# Patient Record
Sex: FEMALE | URBAN - METROPOLITAN AREA
[De-identification: names, ages, dates, MRNs, and addresses within clinical notes are randomized per-mention and may not be internally consistent; named-entity substitution may affect disease eponyms.]

---

## 2019-05-09 ENCOUNTER — APPOINTMENT (RX ONLY)
Dept: URBAN - METROPOLITAN AREA CLINIC 45 | Facility: CLINIC | Age: 64
Setting detail: DERMATOLOGY
End: 2019-05-09

## 2019-05-09 DIAGNOSIS — L85.3 XEROSIS CUTIS: ICD-10-CM

## 2019-05-09 DIAGNOSIS — D22 MELANOCYTIC NEVI: ICD-10-CM

## 2019-05-09 DIAGNOSIS — L43.8 OTHER LICHEN PLANUS: ICD-10-CM

## 2019-05-09 PROBLEM — C18.9 MALIGNANT NEOPLASM OF COLON, UNSPECIFIED: Status: ACTIVE | Noted: 2019-05-09

## 2019-05-09 PROBLEM — L30.9 DERMATITIS, UNSPECIFIED: Status: ACTIVE | Noted: 2019-05-09

## 2019-05-09 PROBLEM — J45.909 UNSPECIFIED ASTHMA, UNCOMPLICATED: Status: ACTIVE | Noted: 2019-05-09

## 2019-05-09 PROBLEM — D22.9 MELANOCYTIC NEVI, UNSPECIFIED: Status: ACTIVE | Noted: 2019-05-09

## 2019-05-09 PROCEDURE — ? COUNSELING

## 2019-05-09 PROCEDURE — 99203 OFFICE O/P NEW LOW 30 MIN: CPT | Mod: 25

## 2019-05-09 PROCEDURE — ? BIOPSY BY PUNCH METHOD

## 2019-05-09 PROCEDURE — ? PRESCRIPTION

## 2019-05-09 PROCEDURE — 11104 PUNCH BX SKIN SINGLE LESION: CPT

## 2019-05-09 PROCEDURE — ? TREATMENT REGIMEN

## 2019-05-09 RX ORDER — CLOBETASOL PROPIONATE 0.5 MG/G
AEROSOL, FOAM TOPICAL
Qty: 1 | Refills: 3 | Status: ERX | COMMUNITY
Start: 2019-05-09

## 2019-05-09 RX ADMIN — CLOBETASOL PROPIONATE: 0.5 AEROSOL, FOAM TOPICAL at 00:00

## 2019-05-09 ASSESSMENT — LOCATION SIMPLE DESCRIPTION DERM: LOCATION SIMPLE: RIGHT ANKLE

## 2019-05-09 ASSESSMENT — LOCATION DETAILED DESCRIPTION DERM: LOCATION DETAILED: RIGHT ANKLE

## 2019-05-09 ASSESSMENT — LOCATION ZONE DERM: LOCATION ZONE: LEG

## 2019-05-09 NOTE — PROCEDURE: BIOPSY BY PUNCH METHOD
Deep Sutures: 5-0 Vicryl
Suture Removal: 7 days
Size Of Lesion In Cm (Optional): 0
Body Location Override (Optional - Billing Will Still Be Based On Selected Body Map Location If Applicable): right foot
Anesthesia Volume In Cc (Will Not Render If 0): 0.5
Wound Care: Bacitracin
Bill For Surgical Tray: no
Detail Level: Simple
Post-Care Instructions: I reviewed with the patient in detail post-care instructions. Patient is to keep the biopsy site dry overnight, and then apply bacitracin twice daily until healed. Patient may apply hydrogen peroxide soaks to remove any crusting.
Punch Size In Mm: 3
Hemostasis: None
Lab: [id number]
Consent: Written consent was obtained and risks were reviewed including but not limited to scarring, infection, bleeding, scabbing, incomplete removal, nerve damage and allergy to anesthesia.
Was A Bandage Applied: Yes
Home Suture Removal Text: Patient was provided a home suture removal kit and will remove their sutures at home.  If they have any questions or difficulties they will call the office.
Epidermal Sutures: 5-0 Ethilon
Lab Facility: [id number]
Anesthesia Type: 1% lidocaine with epinephrine
Billing Type: Third-Party Bill
Notification Instructions: Patient will be notified of biopsy results. However, patient instructed to call the office if not contacted within 2 weeks.
Biopsy Type: H and E
Dressing: bandage

## 2019-05-09 NOTE — PROCEDURE: TREATMENT REGIMEN
Detail Level: Zone
Plan: Location: bilateral legs/ feet\\nPrescribe: Olux-E foam top qd x 30 days\\n\\nPt states that she has purpric lesions on bilateral legs today.\\nPt discussed that it is very itchy.\\nPt has not used anything on them, only OTC.\\nDiscussed with pt that this looks like lichen planus today.\\nPt states that at times it does start as blisters, but does not scratch them.\\nPt discussed that she does have chest problems and has been and out the hospital, which may have caused her to feel a little more stressed out.\\nDiscussed with pt that this could be a factor to her recent flare up.\\nPt is very worried so will do a punch bx to get a definitive dx.\\nDiscussed with pt that we will give her a sample of Olux-E today to use to help with inflammation.\\nWill also prescribe Olux-E if it does return as Lichen Planus.\\nAdvised pt to use a good MC such as Cerave daily to help reestablish a healthy skin barrier.\\nF/u in 2 weeks for s/r.

## 2019-05-23 ENCOUNTER — APPOINTMENT (RX ONLY)
Dept: URBAN - METROPOLITAN AREA CLINIC 45 | Facility: CLINIC | Age: 64
Setting detail: DERMATOLOGY
End: 2019-05-23

## 2019-05-23 DIAGNOSIS — D22 MELANOCYTIC NEVI: ICD-10-CM

## 2019-05-23 DIAGNOSIS — L43.8 OTHER LICHEN PLANUS: ICD-10-CM

## 2019-05-23 DIAGNOSIS — Z48.02 ENCOUNTER FOR REMOVAL OF SUTURES: ICD-10-CM

## 2019-05-23 DIAGNOSIS — L85.3 XEROSIS CUTIS: ICD-10-CM

## 2019-05-23 PROBLEM — D22.9 MELANOCYTIC NEVI, UNSPECIFIED: Status: ACTIVE | Noted: 2019-05-23

## 2019-05-23 PROCEDURE — ? COUNSELING

## 2019-05-23 PROCEDURE — ? PRESCRIPTION

## 2019-05-23 PROCEDURE — ? SUTURE REMOVAL (GLOBAL PERIOD)

## 2019-05-23 PROCEDURE — ? TREATMENT REGIMEN

## 2019-05-23 PROCEDURE — 99213 OFFICE O/P EST LOW 20 MIN: CPT

## 2019-05-23 PROCEDURE — ? PRESCRIPTION SAMPLES PROVIDED

## 2019-05-23 RX ORDER — CLOBETASOL PROPIONATE 0.46 MG/ML
SOLUTION TOPICAL
Qty: 1 | Refills: 3 | Status: ERX | COMMUNITY
Start: 2019-05-23

## 2019-05-23 RX ADMIN — CLOBETASOL PROPIONATE: 0.46 SOLUTION TOPICAL at 16:12

## 2019-05-23 ASSESSMENT — LOCATION DETAILED DESCRIPTION DERM: LOCATION DETAILED: RIGHT ANTERIOR LATERAL MALLEOLUS

## 2019-05-23 ASSESSMENT — LOCATION SIMPLE DESCRIPTION DERM: LOCATION SIMPLE: RIGHT ANKLE

## 2019-05-23 ASSESSMENT — LOCATION ZONE DERM: LOCATION ZONE: LEG

## 2019-05-23 NOTE — PROCEDURE: TREATMENT REGIMEN
Plan: Location: bilateral legs\\n\\nLocation: bilateral legs/ feet\\nPrescribe: Olux-E foam top qd x 30 days\\n\\nPt states that she has purpric lesions on bilateral legs today.\\nPt discussed that it is very itchy.\\nPt has not used anything on them, only OTC.\\nDiscussed with pt that this looks like lichen planus today.\\nPt states that at times it does start as blisters, but does not scratch them.\\nPt discussed that she does have chest problems and has been and out the hospital, which may have caused her to feel a little more stressed out.\\nDiscussed with pt that this could be a factor to her recent flare up.\\nPt is very worried so will do a punch bx to get a definitive dx.\\nDiscussed with pt that we will give her a sample of Olux-E today to use to help with inflammation.\\nWill also prescribe Olux-E if it does return as Lichen Planus.\\nAdvised pt to use a good MC such as Cerave daily to help reestablish a healthy skin barrier.\\nF/u in 2 weeks for s/r.
Detail Level: Zone

## 2019-07-03 ENCOUNTER — APPOINTMENT (RX ONLY)
Dept: URBAN - METROPOLITAN AREA CLINIC 45 | Facility: CLINIC | Age: 64
Setting detail: DERMATOLOGY
End: 2019-07-03

## 2019-07-03 DIAGNOSIS — L85.3 XEROSIS CUTIS: ICD-10-CM

## 2019-07-03 DIAGNOSIS — L43.8 OTHER LICHEN PLANUS: ICD-10-CM

## 2019-07-03 DIAGNOSIS — D22 MELANOCYTIC NEVI: ICD-10-CM

## 2019-07-03 PROBLEM — D22.9 MELANOCYTIC NEVI, UNSPECIFIED: Status: ACTIVE | Noted: 2019-07-03

## 2019-07-03 PROCEDURE — ? COUNSELING

## 2019-07-03 PROCEDURE — ? INTRALESIONAL KENALOG

## 2019-07-03 PROCEDURE — 99213 OFFICE O/P EST LOW 20 MIN: CPT | Mod: 25

## 2019-07-03 PROCEDURE — ? TREATMENT REGIMEN

## 2019-07-03 PROCEDURE — 11900 INJECT SKIN LESIONS </W 7: CPT

## 2019-07-03 ASSESSMENT — LOCATION ZONE DERM
LOCATION ZONE: TRUNK
LOCATION ZONE: LEG
LOCATION ZONE: FEET

## 2019-07-03 ASSESSMENT — LOCATION DETAILED DESCRIPTION DERM
LOCATION DETAILED: RIGHT ANKLE
LOCATION DETAILED: RIGHT DORSAL FOOT
LOCATION DETAILED: RIGHT MEDIAL BUTTOCK

## 2019-07-03 ASSESSMENT — LOCATION SIMPLE DESCRIPTION DERM
LOCATION SIMPLE: RIGHT ANKLE
LOCATION SIMPLE: RIGHT BUTTOCK
LOCATION SIMPLE: RIGHT FOOT

## 2019-07-03 NOTE — PROCEDURE: TREATMENT REGIMEN
Detail Level: Zone
Plan: Location: bilateral legs\\n\\nLocation: bilateral legs/ feet\\nTreatment : Olux-E foam top qd x 30 days\\n\\nPt states that she has purpric lesions on bilateral legs today.\\nPt discussed that it is very itchy.\\nPt has not used anything on them, only OTC.\\nDiscussed with pt that this looks like lichen planus today.\\nPt states that at times it does start as blisters, but does not scratch them.\\nPt discussed that she does have chest problems and has been and out the hospital, which may have caused her to feel a little more stressed out.\\nDiscussed with pt that this could be a factor to her recent flare up.\\nPt is very worried so will do a punch bx to get a definitive dx.\\nDiscussed with pt that we will give her a sample of Olux-E today to use to help with inflammation.\\nWill also prescribe Olux-E if it does return as Lichen Planus.\\nAdvised pt to use a good MC such as Cerave daily to help reestablish a healthy skin barrier.\\nF/u in 2 weeks for s/r.

## 2019-07-03 NOTE — PROCEDURE: INTRALESIONAL KENALOG
Medical Necessity Clause: This procedure was medically necessary because the lesions that were treated were:
Kenalog Preparation: Kenalog
Include Z78.9 (Other Specified Conditions Influencing Health Status) As An Associated Diagnosis?: No
Concentration Of Solution Injected (Mg/Ml): 40.0
Total Volume Injected (Ccs- Only Use Numbers And Decimals): 1
Consent: The risks of atrophy were reviewed with the patient.
X Size Of Lesion In Cm (Optional): 0
Detail Level: Detailed

## 2019-08-22 ENCOUNTER — APPOINTMENT (RX ONLY)
Dept: URBAN - METROPOLITAN AREA CLINIC 45 | Facility: CLINIC | Age: 64
Setting detail: DERMATOLOGY
End: 2019-08-22

## 2019-08-22 DIAGNOSIS — L85.3 XEROSIS CUTIS: ICD-10-CM

## 2019-08-22 DIAGNOSIS — L43.8 OTHER LICHEN PLANUS: ICD-10-CM

## 2019-08-22 PROCEDURE — ? INTRALESIONAL KENALOG

## 2019-08-22 PROCEDURE — ? TREATMENT REGIMEN

## 2019-08-22 PROCEDURE — 99213 OFFICE O/P EST LOW 20 MIN: CPT | Mod: 25

## 2019-08-22 PROCEDURE — ? PRESCRIPTION

## 2019-08-22 PROCEDURE — 11901 INJECT SKIN LESIONS >7: CPT

## 2019-08-22 PROCEDURE — ? COUNSELING

## 2019-08-22 RX ORDER — CLOBETASOL PROPIONATE 0.5 MG/G
OINTMENT TOPICAL
Qty: 1 | Refills: 3 | Status: ERX | COMMUNITY
Start: 2019-08-22

## 2019-08-22 RX ORDER — CLOBETASOL PROPIONATE 0.46 MG/ML
SOLUTION TOPICAL
Qty: 1 | Refills: 3 | Status: ERX

## 2019-08-22 RX ORDER — FLUOCINOLONE ACETONIDE 0.11 MG/ML
OIL TOPICAL
Qty: 1 | Refills: 3 | Status: ERX | COMMUNITY
Start: 2019-08-22

## 2019-08-22 RX ADMIN — FLUOCINOLONE ACETONIDE: 0.11 OIL TOPICAL at 13:13

## 2019-08-22 RX ADMIN — CLOBETASOL PROPIONATE: 0.5 OINTMENT TOPICAL at 13:22

## 2019-08-22 ASSESSMENT — LOCATION DETAILED DESCRIPTION DERM
LOCATION DETAILED: INFERIOR LUMBAR SPINE
LOCATION DETAILED: LEFT ANTERIOR PROXIMAL THIGH
LOCATION DETAILED: RIGHT DORSAL FOOT
LOCATION DETAILED: LEFT ANTERIOR PROXIMAL UPPER ARM
LOCATION DETAILED: RIGHT MEDIAL BUTTOCK
LOCATION DETAILED: RIGHT ANKLE
LOCATION DETAILED: RIGHT PROXIMAL POSTERIOR THIGH
LOCATION DETAILED: LEFT PROXIMAL MEDIAL POSTERIOR THIGH
LOCATION DETAILED: LEFT BUTTOCK
LOCATION DETAILED: EPIGASTRIC SKIN
LOCATION DETAILED: LEFT ANTERIOR DISTAL UPPER ARM
LOCATION DETAILED: RIGHT ANTERIOR DISTAL UPPER ARM
LOCATION DETAILED: LEFT ANKLE
LOCATION DETAILED: RIGHT ANTERIOR PROXIMAL THIGH
LOCATION DETAILED: RIGHT BUTTOCK
LOCATION DETAILED: RIGHT ANTERIOR PROXIMAL UPPER ARM

## 2019-08-22 ASSESSMENT — LOCATION SIMPLE DESCRIPTION DERM
LOCATION SIMPLE: RIGHT BUTTOCK
LOCATION SIMPLE: LEFT THIGH
LOCATION SIMPLE: LEFT BUTTOCK
LOCATION SIMPLE: RIGHT FOOT
LOCATION SIMPLE: RIGHT ANKLE
LOCATION SIMPLE: LEFT UPPER ARM
LOCATION SIMPLE: LEFT POSTERIOR THIGH
LOCATION SIMPLE: LEFT ANKLE
LOCATION SIMPLE: ABDOMEN
LOCATION SIMPLE: RIGHT THIGH
LOCATION SIMPLE: RIGHT UPPER ARM
LOCATION SIMPLE: RIGHT POSTERIOR THIGH
LOCATION SIMPLE: LOWER BACK

## 2019-08-22 ASSESSMENT — LOCATION ZONE DERM
LOCATION ZONE: TRUNK
LOCATION ZONE: FEET
LOCATION ZONE: LEG
LOCATION ZONE: ARM

## 2019-08-22 NOTE — PROCEDURE: INTRALESIONAL KENALOG
Concentration Of Solution Injected (Mg/Ml): 40.0
Detail Level: Detailed
Consent: The risks of atrophy were reviewed with the patient.
Total Volume Injected (Ccs- Only Use Numbers And Decimals): 1
Include Z78.9 (Other Specified Conditions Influencing Health Status) As An Associated Diagnosis?: No
Medical Necessity Clause: This procedure was medically necessary because the lesions that were treated were:
X Size Of Lesion In Cm (Optional): 0
Kenalog Preparation: Kenalog with 5-fluorouracil

## 2019-08-22 NOTE — PROCEDURE: TREATMENT REGIMEN
Detail Level: Zone
Plan: Location: bilateral legs/ feet\\nPrescribe: Derma-Smoothe/FS Body Oil 0.01 % daily\\n                 clobetasol 0.05 % scalp solution \\n                 clobetasol 0.05 % topical ointment\\n\\nPt is here for 3 month f/u.\\nPt has been using Clobetasol ointment on areas, some improvement.\\nPictures taken and compared today.\\nDiscussed with pt that her skin is looking better and not as inflamed as it was previously.\\nPt discussed that the initial areas are clearing up, but new lesions are appearing throughout her body.\\nPt states that her work has been very stressful, which could have triggered new flare up.\\n\\nDiscussed with pt that we will inject areas with 5FU/ ILK40 to help relieve inflammation.\\nDiscussed with pt that we will prescribe Clobetasol solution and ointment so she can have more medication and so she could see which one she likes better.\\n\\nDiscussed with pt that we will prescribe Derma-Smoothe/FS Body Oil to use for body and hands as well.\\nF/u in 6 weeks.\\n---------------\\nPt states that she has purpric lesions on bilateral legs today.\\nPt discussed that it is very itchy.\\nPt has not used anything on them, only OTC.\\nDiscussed with pt that this looks like lichen planus today.\\nPt states that at times it does start as blisters, but does not scratch them.\\nPt discussed that she does have chest problems and has been and out the hospital, which may have caused her to feel a little more stressed out.\\nDiscussed with pt that this could be a factor to her recent flare up.\\nPt is very worried so will do a punch bx to get a definitive dx.\\nDiscussed with pt that we will give her a sample of Olux-E today to use to help with inflammation.\\nWill also prescribe Olux-E if it does return as Lichen Planus.\\nAdvised pt to use a good MC such as Cerave daily to help reestablish a healthy skin barrier.\\nF/u in 2 weeks for s/r.

## 2019-11-07 ENCOUNTER — APPOINTMENT (RX ONLY)
Dept: URBAN - METROPOLITAN AREA CLINIC 45 | Facility: CLINIC | Age: 64
Setting detail: DERMATOLOGY
End: 2019-11-07

## 2019-11-07 DIAGNOSIS — L85.3 XEROSIS CUTIS: ICD-10-CM

## 2019-11-07 DIAGNOSIS — L43.8 OTHER LICHEN PLANUS: ICD-10-CM

## 2019-11-07 PROCEDURE — ? TREATMENT REGIMEN

## 2019-11-07 PROCEDURE — ? COUNSELING

## 2019-11-07 PROCEDURE — ? PRESCRIPTION

## 2019-11-07 PROCEDURE — 99213 OFFICE O/P EST LOW 20 MIN: CPT

## 2019-11-07 RX ORDER — FLUOCINOLONE ACETONIDE 0.11 MG/ML
OIL TOPICAL
Qty: 1 | Refills: 3 | Status: ERX

## 2019-11-07 RX ORDER — CLOBETASOL PROPIONATE 0.5 MG/G
OINTMENT TOPICAL
Qty: 1 | Refills: 4 | Status: ERX

## 2019-11-07 ASSESSMENT — LOCATION DETAILED DESCRIPTION DERM: LOCATION DETAILED: RIGHT MEDIAL BUTTOCK

## 2019-11-07 ASSESSMENT — LOCATION SIMPLE DESCRIPTION DERM: LOCATION SIMPLE: RIGHT BUTTOCK

## 2019-11-07 ASSESSMENT — LOCATION ZONE DERM: LOCATION ZONE: TRUNK

## 2020-02-06 ENCOUNTER — APPOINTMENT (RX ONLY)
Dept: URBAN - METROPOLITAN AREA CLINIC 45 | Facility: CLINIC | Age: 65
Setting detail: DERMATOLOGY
End: 2020-02-06

## 2020-02-06 DIAGNOSIS — L43.8 OTHER LICHEN PLANUS: ICD-10-CM

## 2020-02-06 DIAGNOSIS — L85.3 XEROSIS CUTIS: ICD-10-CM

## 2020-02-06 PROCEDURE — ? INTRALESIONAL KENALOG

## 2020-02-06 PROCEDURE — ? COUNSELING

## 2020-02-06 PROCEDURE — ? TREATMENT REGIMEN

## 2020-02-06 PROCEDURE — ? PRESCRIPTION

## 2020-02-06 PROCEDURE — 99213 OFFICE O/P EST LOW 20 MIN: CPT | Mod: 25

## 2020-02-06 RX ORDER — CLOBETASOL PROPIONATE 0.5 MG/G
OINTMENT TOPICAL
Qty: 1 | Refills: 4 | Status: ERX

## 2020-02-06 RX ORDER — FLUOCINOLONE ACETONIDE 0.11 MG/ML
OIL TOPICAL
Qty: 1 | Refills: 3 | Status: ERX

## 2020-02-06 ASSESSMENT — LOCATION SIMPLE DESCRIPTION DERM
LOCATION SIMPLE: RIGHT BUTTOCK
LOCATION SIMPLE: LEFT BUTTOCK
LOCATION SIMPLE: LEFT PRETIBIAL REGION
LOCATION SIMPLE: RIGHT LOWER BACK
LOCATION SIMPLE: LOWER BACK
LOCATION SIMPLE: LEFT LOWER BACK
LOCATION SIMPLE: RIGHT PRETIBIAL REGION

## 2020-02-06 ASSESSMENT — LOCATION ZONE DERM
LOCATION ZONE: LEG
LOCATION ZONE: TRUNK

## 2020-02-06 ASSESSMENT — LOCATION DETAILED DESCRIPTION DERM
LOCATION DETAILED: RIGHT SUPERIOR MEDIAL LOWER BACK
LOCATION DETAILED: SUPERIOR LUMBAR SPINE
LOCATION DETAILED: RIGHT MEDIAL BUTTOCK
LOCATION DETAILED: SACRAL SPINE
LOCATION DETAILED: LEFT SUPERIOR MEDIAL LOWER BACK
LOCATION DETAILED: LEFT MEDIAL BUTTOCK
LOCATION DETAILED: LEFT DISTAL PRETIBIAL REGION
LOCATION DETAILED: RIGHT DISTAL PRETIBIAL REGION

## 2020-02-06 NOTE — PROCEDURE: INTRALESIONAL KENALOG
Medical Necessity Clause: This procedure was medically necessary because the lesions that were treated were:
Kenalog Preparation: Kenalog
X Size Of Lesion In Cm (Optional): 0
Detail Level: Detailed
Total Volume Injected (Ccs- Only Use Numbers And Decimals): 0.1
Consent: The risks of atrophy were reviewed with the patient.
Include Z78.9 (Other Specified Conditions Influencing Health Status) As An Associated Diagnosis?: No
Concentration Of Solution Injected (Mg/Ml): 2.5

## 2020-02-06 NOTE — PROCEDURE: TREATMENT REGIMEN
Detail Level: Zone
Plan: Location: bilateral legs/ feet\\nPrescribe: Derma-Smoothe/FS Body Oil 0.01 % daily\\n                 clobetasol 0.05 % scalp solution \\n                 clobetasol 0.05 % topical ointment\\nTreatment: 5FU/K40 injections\\n\\n02/06/2020\\n\\nPatient is here for a 3 month follow up \\nPatient states she was instructed by her pharmacist to only use topical steroids for two weeks and then take one week off and repeat the process.\\nDiscussed with the patient that she should use topical steroids until inflammation has calmed down and then once that has occurred she should then use medication as needed.  \\nToday I will be injecting patient with 5FU/K40 to eliminate inflammation and allow skin to heal.  \\nInstructions were written in detail and handed to the patient.\\nPLAN:\\n1. Derma-smoothe body oil is to be applied daily after shower, safe to use daily.\\n2. Clobetasol topical cream and solution is safe to use daily until flares have improved and then she is to use as needed. \\nPatient is to continue current treatment regimen and follow up in 3 months \\n\\nPatient is to follow up in 3 months \\n---------------------------------------------------------------------\\n\\nPt is here for 3 month f/u.\\nPt has been using Clobetasol ointment on areas, some improvement.\\nPictures taken and compared today.\\nDiscussed with pt that her skin is looking better and not as inflamed as it was previously.\\nPt discussed that the initial areas are clearing up, but new lesions are appearing throughout her body.\\nSHE IS ONLY HAVING A FEW LESIONS THAT ARE ACTIVE----MOST OF WHAT IS VISIBLE TODAY IS SIMPLY HYPERPIGMENTATION\\n---Encouraged her to continue using the clobetasol on active lesions\\nPt states that her work has been very stressful, which could have triggered new flare up.\\n\\nDiscussed with pt that we will inject areas with 5FU/ ILK40 to help relieve inflammation.\\nDiscussed with pt that we will prescribe Clobetasol solution and ointment so she can have more medication and so she could see which one she likes better.\\n\\nDiscussed with pt that we will prescribe Derma-Smoothe/FS Body Oil to use for body and hands as well.\\nF/u in 6 weeks.\\n---------------\\nPt states that she has purpric lesions on bilateral legs today.\\nPt discussed that it is very itchy.\\nPt has not used anything on them, only OTC.\\nDiscussed with pt that this looks like lichen planus today.\\nPt states that at times it does start as blisters, but does not scratch them.\\nPt discussed that she does have chest problems and has been and out the hospital, which may have caused her to feel a little more stressed out.\\nDiscussed with pt that this could be a factor to her recent flare up.\\nPt is very worried so will do a punch bx to get a definitive dx.\\nDiscussed with pt that we will give her a sample of Olux-E today to use to help with inflammation.\\nWill also prescribe Olux-E if it does return as Lichen Planus.\\nAdvised pt to use a good MC such as Cerave daily to help reestablish a healthy skin barrier.\\nF/u in 2 weeks for s/r.

## 2020-03-25 RX ORDER — CLOBETASOL PROPIONATE 0.5 MG/ML
SOLUTION TOPICAL
Qty: 1 | Refills: 3 | Status: ERX

## 2020-03-25 RX ORDER — CLOBETASOL PROPIONATE 0.5 MG/G
OINTMENT TOPICAL
Qty: 1 | Refills: 4 | Status: ERX

## 2020-03-30 ENCOUNTER — RX ONLY (OUTPATIENT)
Age: 65
Setting detail: RX ONLY
End: 2020-03-30

## 2020-03-30 RX ORDER — CLOBETASOL PROPIONATE 0.5 MG/ML
SOLUTION TOPICAL
Qty: 1 | Refills: 3 | Status: ERX

## 2020-04-30 ENCOUNTER — RX ONLY (OUTPATIENT)
Age: 65
Setting detail: RX ONLY
End: 2020-04-30

## 2020-04-30 ENCOUNTER — APPOINTMENT (RX ONLY)
Dept: URBAN - METROPOLITAN AREA CLINIC 45 | Facility: CLINIC | Age: 65
Setting detail: DERMATOLOGY
End: 2020-04-30

## 2020-04-30 DIAGNOSIS — L85.3 XEROSIS CUTIS: ICD-10-CM

## 2020-04-30 DIAGNOSIS — L43.8 OTHER LICHEN PLANUS: ICD-10-CM

## 2020-04-30 PROCEDURE — 11901 INJECT SKIN LESIONS >7: CPT

## 2020-04-30 PROCEDURE — ? COUNSELING

## 2020-04-30 PROCEDURE — ? TREATMENT REGIMEN

## 2020-04-30 PROCEDURE — 99213 OFFICE O/P EST LOW 20 MIN: CPT | Mod: 25

## 2020-04-30 PROCEDURE — ? PRESCRIPTION

## 2020-04-30 PROCEDURE — ? INTRALESIONAL KENALOG

## 2020-04-30 RX ORDER — CLOBETASOL PROPIONATE 0.5 MG/ML
SOLUTION TOPICAL
Qty: 1 | Refills: 3 | Status: CANCELLED
Stop reason: CLARIF

## 2020-04-30 RX ORDER — FLUOCINOLONE ACETONIDE 0.11 MG/ML
OIL TOPICAL
Qty: 1 | Refills: 3 | Status: ERX

## 2020-04-30 RX ORDER — CLOBETASOL PROPIONATE 0.5 MG/G
OINTMENT TOPICAL
Qty: 1 | Refills: 4 | Status: CANCELLED
Stop reason: ENTERED-IN-ERROR

## 2020-04-30 RX ORDER — CLOBETASOL PROPIONATE 0.5 MG/G
OINTMENT TOPICAL
Qty: 1 | Refills: 4 | Status: ERX

## 2020-04-30 ASSESSMENT — LOCATION ZONE DERM
LOCATION ZONE: TRUNK
LOCATION ZONE: LEG

## 2020-04-30 ASSESSMENT — LOCATION DETAILED DESCRIPTION DERM
LOCATION DETAILED: RIGHT MEDIAL BUTTOCK
LOCATION DETAILED: LEFT SUPERIOR MEDIAL LOWER BACK
LOCATION DETAILED: SACRAL SPINE
LOCATION DETAILED: LEFT MEDIAL BUTTOCK
LOCATION DETAILED: RIGHT DISTAL PRETIBIAL REGION
LOCATION DETAILED: RIGHT SUPERIOR MEDIAL LOWER BACK
LOCATION DETAILED: SUPERIOR LUMBAR SPINE
LOCATION DETAILED: LEFT DISTAL PRETIBIAL REGION

## 2020-04-30 ASSESSMENT — LOCATION SIMPLE DESCRIPTION DERM
LOCATION SIMPLE: LEFT LOWER BACK
LOCATION SIMPLE: RIGHT LOWER BACK
LOCATION SIMPLE: LOWER BACK
LOCATION SIMPLE: LEFT BUTTOCK
LOCATION SIMPLE: RIGHT PRETIBIAL REGION
LOCATION SIMPLE: RIGHT BUTTOCK
LOCATION SIMPLE: LEFT PRETIBIAL REGION

## 2020-04-30 NOTE — PROCEDURE: INTRALESIONAL KENALOG
Kenalog Preparation: Kenalog
Detail Level: Detailed
Concentration Of Solution Injected (Mg/Ml): 2.5
X Size Of Lesion In Cm (Optional): 0
Consent: The risks of atrophy were reviewed with the patient.
Medical Necessity Clause: This procedure was medically necessary because the lesions that were treated were:
Total Volume Injected (Ccs- Only Use Numbers And Decimals): 0.1
Include Z78.9 (Other Specified Conditions Influencing Health Status) As An Associated Diagnosis?: No

## 2020-04-30 NOTE — PROCEDURE: TREATMENT REGIMEN
Detail Level: Zone
Plan: Location: bilateral legs/ feet, abdomen, back, arms and under arms\\nPrescribe: Derma-Smoothe/FS Body Oil 0.01 % daily\\n                 clobetasol 0.05 % scalp solution \\n                 clobetasol 0.05 % topical ointment\\nTreatment: 5FU/K40 injections \\n\\nFollow up \\nLOV 02/06/2020\\nPhotos taken\\n\\nPt comes in today for a 2 month follow up\\nShe states she has been under so much stress and has not been able to calm her purpric lesions\\nShe states that she is so worried these lesions will never go away and is very confused of how to treat herself.\\nPt states there has been a mix up in her medications to her knowledge and is confused in how to use them, and probably has not used them accordingly to the correct knowledge.\\nShe states that there are new lesions on her back and her abdomen and inner thighs.\\n\\nDiscussed with Pt:\\nShe has weapons with her that are for her to better herself.\\nThe Clobetasol Ointment and the Derma Smoothe/FS Body Oil are not harmful for her not to use them to treat her condition.\\nI will be prescribing some other Clobetasol, like the solution and the cream for her to get.\\nToday I will inject the areas that are more inflamed and flared up with 5 FU/K40.\\nShe is to use the clobetasol 0.05 % topical ointment when flared up and the Derma-Smoothe/FS Body Oil 0.01 % daily when flattened.\\nShe can also use the Derma-Smoothe/FS Body Oil 0.01 % on top of the clobetasol 0.05 % topical ointment, solution, or cream.\\nRecommended pt to find an activity to help her reduce her anxiety and her stress to help her stay more calm and less inflamed and flared up.\\nPt states she will try and she will email pictures of the medications that she has on hand so I may send what she doesn't have.\\n\\nFollow up in 1 month.\\n---------------------------------------------------------------------------\\n02/06/2020\\n\\nPatient is here for a 3 month follow up \\nPatient states she was instructed by her pharmacist to only use topical steroids for two weeks and then take one week off and repeat the process.\\nDiscussed with the patient that she should use topical steroids until inflammation has calmed down and then once that has occurred she should then use medication as needed.  \\nToday I will be injecting patient with 5FU/K40 to eliminate inflammation and allow skin to heal.  \\nInstructions were written in detail and handed to the patient.\\nPLAN:\\n1. Derma-smoothe body oil is to be applied daily after shower, safe to use daily.\\n2. Clobetasol topical cream and solution is safe to use daily until flares have improved and then she is to use as needed. \\nPatient is to continue current treatment regimen and follow up in 3 months \\n\\nPatient is to follow up in 3 months \\n---------------------------------------------------------------------\\n\\nPt is here for 3 month f/u.\\nPt has been using Clobetasol ointment on areas, some improvement.\\nPictures taken and compared today.\\nDiscussed with pt that her skin is looking better and not as inflamed as it was previously.\\nPt discussed that the initial areas are clearing up, but new lesions are appearing throughout her body.\\nSHE IS ONLY HAVING A FEW LESIONS THAT ARE ACTIVE----MOST OF WHAT IS VISIBLE TODAY IS SIMPLY HYPERPIGMENTATION\\n---Encouraged her to continue using the clobetasol on active lesions\\nPt states that her work has been very stressful, which could have triggered new flare up.\\n\\nDiscussed with pt that we will inject areas with 5FU/ ILK40 to help relieve inflammation.\\nDiscussed with pt that we will prescribe Clobetasol solution and ointment so she can have more medication and so she could see which one she likes better.\\n\\nDiscussed with pt that we will prescribe Derma-Smoothe/FS Body Oil to use for body and hands as well.\\nF/u in 6 weeks.\\n---------------\\nPt states that she has purpric lesions on bilateral legs today.\\nPt discussed that it is very itchy.\\nPt has not used anything on them, only OTC.\\nDiscussed with pt that this looks like lichen planus today.\\nPt states that at times it does start as blisters, but does not scratch them.\\nPt discussed that she does have chest problems and has been and out the hospital, which may have caused her to feel a little more stressed out.\\nDiscussed with pt that this could be a factor to her recent flare up.\\nPt is very worried so will do a punch bx to get a definitive dx.\\nDiscussed with pt that we will give her a sample of Olux-E today to use to help with inflammation.\\nWill also prescribe Olux-E if it does return as Lichen Planus.\\nAdvised pt to use a good MC such as Cerave daily to help reestablish a healthy skin barrier.\\nF/u in 2 weeks for s/r.

## 2020-05-04 ENCOUNTER — RX ONLY (OUTPATIENT)
Age: 65
Setting detail: RX ONLY
End: 2020-05-04

## 2020-05-04 RX ORDER — TRIAMCINOLONE ACETONIDE OINTMENT 0.5 MG/G
OINTMENT TOPICAL
Qty: 1 | Refills: 3 | Status: ERX | COMMUNITY
Start: 2020-05-04

## 2020-05-04 RX ORDER — CLOBETASOL PROPIONATE 0.5 MG/G
CREAM TOPICAL
Qty: 1 | Refills: 0 | Status: ERX | COMMUNITY
Start: 2020-05-04

## 2020-05-28 ENCOUNTER — APPOINTMENT (RX ONLY)
Dept: URBAN - METROPOLITAN AREA CLINIC 45 | Facility: CLINIC | Age: 65
Setting detail: DERMATOLOGY
End: 2020-05-28

## 2020-05-28 DIAGNOSIS — L72.8 OTHER FOLLICULAR CYSTS OF THE SKIN AND SUBCUTANEOUS TISSUE: ICD-10-CM

## 2020-05-28 DIAGNOSIS — L85.3 XEROSIS CUTIS: ICD-10-CM

## 2020-05-28 DIAGNOSIS — L43.8 OTHER LICHEN PLANUS: ICD-10-CM

## 2020-05-28 PROCEDURE — ? PRESCRIPTION

## 2020-05-28 PROCEDURE — ? TREATMENT REGIMEN

## 2020-05-28 PROCEDURE — ? PRESCRIPTION SAMPLES PROVIDED

## 2020-05-28 PROCEDURE — ? INTRALESIONAL KENALOG

## 2020-05-28 PROCEDURE — ? COUNSELING

## 2020-05-28 PROCEDURE — 11901 INJECT SKIN LESIONS >7: CPT

## 2020-05-28 PROCEDURE — 99213 OFFICE O/P EST LOW 20 MIN: CPT | Mod: 25

## 2020-05-28 RX ORDER — CLOBETASOL PROPIONATE 0.5 MG/G
OINTMENT TOPICAL
Qty: 1 | Refills: 4 | Status: CANCELLED
Stop reason: CLARIF

## 2020-05-28 RX ORDER — FLUOCINOLONE ACETONIDE 0.11 MG/ML
OIL TOPICAL
Qty: 1 | Refills: 3 | Status: ERX

## 2020-05-28 ASSESSMENT — LOCATION SIMPLE DESCRIPTION DERM
LOCATION SIMPLE: LEFT PRETIBIAL REGION
LOCATION SIMPLE: LOWER BACK
LOCATION SIMPLE: RIGHT PRETIBIAL REGION
LOCATION SIMPLE: RIGHT LOWER BACK
LOCATION SIMPLE: RIGHT BUTTOCK
LOCATION SIMPLE: LEFT BUTTOCK
LOCATION SIMPLE: VULVA
LOCATION SIMPLE: LEFT LOWER BACK

## 2020-05-28 ASSESSMENT — LOCATION ZONE DERM
LOCATION ZONE: LEG
LOCATION ZONE: TRUNK
LOCATION ZONE: VULVA

## 2020-05-28 ASSESSMENT — LOCATION DETAILED DESCRIPTION DERM
LOCATION DETAILED: RIGHT MEDIAL BUTTOCK
LOCATION DETAILED: RIGHT LABIA MAJORA
LOCATION DETAILED: RIGHT SUPERIOR MEDIAL LOWER BACK
LOCATION DETAILED: LEFT DISTAL PRETIBIAL REGION
LOCATION DETAILED: RIGHT DISTAL PRETIBIAL REGION
LOCATION DETAILED: SACRAL SPINE
LOCATION DETAILED: LEFT SUPERIOR MEDIAL LOWER BACK
LOCATION DETAILED: SUPERIOR LUMBAR SPINE
LOCATION DETAILED: LEFT MEDIAL BUTTOCK

## 2020-05-28 NOTE — PROCEDURE: TREATMENT REGIMEN
Plan: Location: bilateral legs/ feet, abdomen, back, arms and under arms\\nPrescribe: Derma-Smoothe/FS Body Oil 0.01 % daily\\n                 clobetasol 0.05 % topical ointment\\nTreatment: 5FU/K40 injections \\nPharmacy: [facility]\\n\\nFollow up \\nLOV 04/30/20\\nPhotos taken \\n\\nPt comes in today for a one follow up appointment \\nToday her lesions are somewhat calm and not so many are flared up but for sure the purpuric color is still visible \\nShe has been applying the derma smooth oil all over body and the ointment to he most active ones.\\nPt states that she just recently received her medication ointment about three days ago.\\nPt states that she had to call her insurance herself because the medication was nit being prescribed correctly by our office \\nShe spoke with Deana, the medical assistant on a three way with the pharmacy part of her insurance and that she hung up on her and from there on she was unable to speak to Deana and has been talking to Rosario and she is the one that helped her out \\nPt states she is very stressed just by trying to get her prescription just alone \\nShe states that her insurance has told her she doesn’t have to be paying for any of her medications, and this is why she did not want medications to be sent any where else but [facility] \\n\\nDiscussed with pt that it has been very difficult trying to get her medication filled due to the exclusions and certain formularies her insurance does not cover.\\nI mentioned to pt that Deana is one of the medical assistance that I rely on to have difficult task to get accomplished and who has a lot of interest in always helping the patients that I truly doubt she would have hanged up on her \\nPt stated at the time if she was being called a liar, I told pt I am nit calling you a liar, but as well I know that Deana would have not have hung up on you, the call must if dropped.\\nI also mentioned to pt how the Pharmacist from [facility] had been great help and as well had spent great time with her insurance in trying to get her medication approved.\\nI know there was a document that the representative from [employer] had faxed over for me to sign on May 5th for Clobetasol 0.05% 30grams Topical Ointment to get approved and it was not until 3 days ago that only ONE tube of 15grams was approved.\\nI explained to pt that I will always be happy to sign a Prior Authorization form to get her medication approved but she needs to not let this work her up to a degree that she is causing flare ups and making her diagnoses worst.\\nI mentioned to pt that I want to help her get better, and that I don’t want this situation of a prescription to create a wedge on a provider and pt relationship and we have to reset and move forward from here with a more positive attitude, and for her just to know that her insurance has certain requirements for medications to be approved that at times can and will take time just like this last time \\nPt stated she will take the advice and was very apologetic and stated my recommendation to will benefit her to minimize her stress level \\nToday I injected 5FU/K40 to all flare up lesions \\n\\nFollow up 6 weeks.\\n\\n——————————\\nFollow up \\nLOV 02/06/2020\\nPhotos taken\\n\\nPt comes in today for a 2 month follow up\\nShe states she has been under so much stress and has not been able to calm her purpric lesions\\nShe states that she is so worried these lesions will never go away and is very confused of how to treat herself.\\nPt states there has been a mix up in her medications to her knowledge and is confused in how to use them, and probably has not used them accordingly to the correct knowledge.\\nShe states that there are new lesions on her back and her abdomen and inner thighs.\\n\\nDiscussed with Pt:\\nShe has weapons with her that are for her to better herself.\\nThe Clobetasol Ointment and the Derma Smoothe/FS Body Oil are not harmful for her not to use them to treat her condition.\\nI will be prescribing some other Clobetasol, like the solution and the cream for her to get.\\nToday I will inject the areas that are more inflamed and flared up with 5 FU/K40.\\nShe is to use the clobetasol 0.05 % topical ointment when flared up and the Derma-Smoothe/FS Body Oil 0.01 % daily when flattened.\\nShe can also use the Derma-Smoothe/FS Body Oil 0.01 % on top of the clobetasol 0.05 % topical ointment, solution, or cream.\\nRecommended pt to find an activity to help her reduce her anxiety and her stress to help her stay more calm and less inflamed and flared up.\\nPt states she will try and she will email pictures of the medications that she has on hand so I may send what she doesn't have.\\n\\nFollow up in 1 month.\\n---------------------------------------------------------------------------\\n02/06/2020\\n\\nPatient is here for a 3 month follow up \\nPatient states she was instructed by her pharmacist to only use topical steroids for two weeks and then take one week off and repeat the process.\\nDiscussed with the patient that she should use topical steroids until inflammation has calmed down and then once that has occurred she should then use medication as needed.  \\nToday I will be injecting patient with 5FU/K40 to eliminate inflammation and allow skin to heal.  \\nInstructions were written in detail and handed to the patient.\\nPLAN:\\n1. Derma-smoothe body oil is to be applied daily after shower, safe to use daily.\\n2. Clobetasol topical cream and solution is safe to use daily until flares have improved and then she is to use as needed. \\nPatient is to continue current treatment regimen and follow up in 3 months \\n\\nPatient is to follow up in 3 months \\n---------------------------------------------------------------------\\n\\nPt is here for 3 month f/u.\\nPt has been using Clobetasol ointment on areas, some improvement.\\nPictures taken and compared today.\\nDiscussed with pt that her skin is looking better and not as inflamed as it was previously.\\nPt discussed that the initial areas are clearing up, but new lesions are appearing throughout her body.\\nSHE IS ONLY HAVING A FEW LESIONS THAT ARE ACTIVE----MOST OF WHAT IS VISIBLE TODAY IS SIMPLY HYPERPIGMENTATION\\n---Encouraged her to continue using the clobetasol on active lesions\\nPt states that her work has been very stressful, which could have triggered new flare up.\\n\\nDiscussed with pt that we will inject areas with 5FU/ ILK40 to help relieve inflammation.\\nDiscussed with pt that we will prescribe Clobetasol solution and ointment so she can have more medication and so she could see which one she likes better.\\n\\nDiscussed with pt that we will prescribe Derma-Smoothe/FS Body Oil to use for body and hands as well.\\nF/u in 6 weeks.\\n---------------\\nPt states that she has purpric lesions on bilateral legs today.\\nPt discussed that it is very itchy.\\nPt has not used anything on them, only OTC.\\nDiscussed with pt that this looks like lichen planus today.\\nPt states that at times it does start as blisters, but does not scratch them.\\nPt discussed that she does have chest problems and has been and out the hospital, which may have caused her to feel a little more stressed out.\\nDiscussed with pt that this could be a factor to her recent flare up.\\nPt is very worried so will do a punch bx to get a definitive dx.\\nDiscussed with pt that we will give her a sample of Olux-E today to use to help with inflammation.\\nWill also prescribe Olux-E if it does return as Lichen Planus.\\nAdvised pt to use a good MC such as Cerave daily to help reestablish a healthy skin barrier.\\nF/u in 2 weeks for s/r.
Detail Level: Zone
Plan: Location: Private Labia [facility]a/Inguinal\\nSamples Given: Ximino 135mg Extended Release\\nPrescribe: Bactrim -160 mg tablet\\nPharmacy: [facility]\\n\\nEst Pt \\nNew Concern\\nNo photos taken\\n\\nPt comes in with concern of a bump on her inguinal/labia area\\nPt states this bump has been there now for more than 6 weeks.\\nShe had not addressed it because it was very small, but it has grown in size in the past few days.\\n\\nDiscussed with pt:\\nCyst can become very angry and red and can at times rupture and cause pain.\\nWhen cyst are angry and red like the one today an anti-inflammatory antibiotic has to be taken \\nPt will be given a 3 day sample of Ximino 135mg-Extended Release so she may start while she fills \\nBactrim -160mg tablet.\\nPt is instructed to take antibiotics until the cyst has reduced in size and she is to put the remainder of the antibiotic in her medicine cabinet for when she needs it again and until she has decided to schedule an excision to remove cyst.\\nI had a lengthy discussion with pt what to expect during and after excision.\\nPt states understanding and will schedule once ready.\\n\\nPRN.

## 2020-05-28 NOTE — PROCEDURE: INTRALESIONAL KENALOG
Kenalog Preparation: Kenalog
Detail Level: Detailed
X Size Of Lesion In Cm (Optional): 0
Total Volume Injected (Ccs- Only Use Numbers And Decimals): 0.1
Consent: The risks of atrophy were reviewed with the patient.
Medical Necessity Clause: This procedure was medically necessary because the lesions that were treated were:
Include Z78.9 (Other Specified Conditions Influencing Health Status) As An Associated Diagnosis?: No
Concentration Of Solution Injected (Mg/Ml): 2.5

## 2022-01-18 NOTE — PROCEDURE: SUTURE REMOVAL (GLOBAL PERIOD)
Detail Level: Simple
Add 04528 Cpt? (Important Note: In 2017 The Use Of 87730 Is Being Tracked By Cms To Determine Future Global Period Reimbursement For Global Periods): no
Niacinamide Counseling: I recommended taking niacin or niacinamide, also know as vitamin B3, twice daily. Recent evidence suggests that taking vitamin B3 (500 mg twice daily) can reduce the risk of actinic keratoses and non-melanoma skin cancers. Side effects of vitamin B3 include flushing and headache.